# Patient Record
Sex: MALE | Race: AMERICAN INDIAN OR ALASKA NATIVE | NOT HISPANIC OR LATINO | Employment: FULL TIME | URBAN - METROPOLITAN AREA
[De-identification: names, ages, dates, MRNs, and addresses within clinical notes are randomized per-mention and may not be internally consistent; named-entity substitution may affect disease eponyms.]

---

## 2018-09-10 ENCOUNTER — HOSPITAL ENCOUNTER (EMERGENCY)
Facility: HOSPITAL | Age: 23
Discharge: HOME | End: 2018-09-10
Attending: SURGERY
Payer: COMMERCIAL

## 2018-09-10 VITALS
HEART RATE: 68 BPM | SYSTOLIC BLOOD PRESSURE: 142 MMHG | TEMPERATURE: 97 F | RESPIRATION RATE: 16 BRPM | WEIGHT: 190 LBS | DIASTOLIC BLOOD PRESSURE: 66 MMHG | BODY MASS INDEX: 25.77 KG/M2

## 2018-09-10 DIAGNOSIS — R51.9 HEADACHE: ICD-10-CM

## 2018-09-10 LAB
ALBUMIN SERPL BCP-MCNC: 4.4 G/DL
ALP SERPL-CCNC: 88 U/L
ALT SERPL W/O P-5'-P-CCNC: 32 U/L
AMPHET+METHAMPHET UR QL: NEGATIVE
ANION GAP SERPL CALC-SCNC: 9 MMOL/L
APTT BLDCRRT: 28.2 SEC
AST SERPL-CCNC: 26 U/L
BARBITURATES UR QL SCN>200 NG/ML: NEGATIVE
BASOPHILS # BLD AUTO: 0.01 K/UL
BASOPHILS NFR BLD: 0.2 %
BENZODIAZ UR QL SCN>200 NG/ML: NEGATIVE
BILIRUB SERPL-MCNC: 0.3 MG/DL
BILIRUB UR QL STRIP: NEGATIVE
BNP SERPL-MCNC: <10 PG/ML
BUN SERPL-MCNC: 11 MG/DL
BZE UR QL SCN: NEGATIVE
CALCIUM SERPL-MCNC: 10.1 MG/DL
CANNABINOIDS UR QL SCN: NEGATIVE
CHLORIDE SERPL-SCNC: 106 MMOL/L
CK MB SERPL-MCNC: 1.2 NG/ML
CK MB SERPL-RTO: 0.8 %
CK SERPL-CCNC: 154 U/L
CK SERPL-CCNC: 154 U/L
CLARITY UR: CLEAR
CO2 SERPL-SCNC: 25 MMOL/L
COLOR UR: YELLOW
CREAT SERPL-MCNC: 1.2 MG/DL
CREAT UR-MCNC: 280 MG/DL
DIFFERENTIAL METHOD: ABNORMAL
EOSINOPHIL # BLD AUTO: 0.2 K/UL
EOSINOPHIL NFR BLD: 2.8 %
ERYTHROCYTE [DISTWIDTH] IN BLOOD BY AUTOMATED COUNT: 13.8 %
ERYTHROCYTE [SEDIMENTATION RATE] IN BLOOD BY WESTERGREN METHOD: 2 MM/HR
EST. GFR  (AFRICAN AMERICAN): >60 ML/MIN/1.73 M^2
EST. GFR  (NON AFRICAN AMERICAN): >60 ML/MIN/1.73 M^2
GLUCOSE SERPL-MCNC: 79 MG/DL
GLUCOSE UR QL STRIP: NEGATIVE
HCT VFR BLD AUTO: 43.9 %
HGB BLD-MCNC: 14.4 G/DL
HGB UR QL STRIP: NEGATIVE
INR PPP: 1.1
KETONES UR QL STRIP: NEGATIVE
LEUKOCYTE ESTERASE UR QL STRIP: NEGATIVE
LYMPHOCYTES # BLD AUTO: 1.4 K/UL
LYMPHOCYTES NFR BLD: 24.9 %
MAGNESIUM SERPL-MCNC: 2.4 MG/DL
MCH RBC QN AUTO: 26.4 PG
MCHC RBC AUTO-ENTMCNC: 32.8 G/DL
MCV RBC AUTO: 81 FL
METHADONE UR QL SCN>300 NG/ML: NEGATIVE
MONOCYTES # BLD AUTO: 0.5 K/UL
MONOCYTES NFR BLD: 8.3 %
NEUTROPHILS # BLD AUTO: 3.7 K/UL
NEUTROPHILS NFR BLD: 63.8 %
NITRITE UR QL STRIP: NEGATIVE
OPIATES UR QL SCN: NEGATIVE
PCP UR QL SCN>25 NG/ML: NEGATIVE
PH UR STRIP: 6 [PH] (ref 5–8)
PHOSPHATE SERPL-MCNC: 3.6 MG/DL
PLATELET # BLD AUTO: 204 K/UL
PMV BLD AUTO: 10.9 FL
POTASSIUM SERPL-SCNC: 3.9 MMOL/L
PROT SERPL-MCNC: 8 G/DL
PROT UR QL STRIP: NEGATIVE
PROTHROMBIN TIME: 11.4 SEC
RBC # BLD AUTO: 5.45 M/UL
SODIUM SERPL-SCNC: 140 MMOL/L
SP GR UR STRIP: >=1.03 (ref 1–1.03)
TOXICOLOGY INFORMATION: NORMAL
TROPONIN I SERPL DL<=0.01 NG/ML-MCNC: <0.006 NG/ML
TSH SERPL DL<=0.005 MIU/L-ACNC: 0.95 UIU/ML
URN SPEC COLLECT METH UR: ABNORMAL
UROBILINOGEN UR STRIP-ACNC: 1 EU/DL
WBC # BLD AUTO: 5.75 K/UL

## 2018-09-10 PROCEDURE — 84443 ASSAY THYROID STIM HORMONE: CPT

## 2018-09-10 PROCEDURE — 83735 ASSAY OF MAGNESIUM: CPT

## 2018-09-10 PROCEDURE — 83880 ASSAY OF NATRIURETIC PEPTIDE: CPT

## 2018-09-10 PROCEDURE — 85651 RBC SED RATE NONAUTOMATED: CPT

## 2018-09-10 PROCEDURE — 93010 ELECTROCARDIOGRAM REPORT: CPT | Mod: ,,, | Performed by: INTERNAL MEDICINE

## 2018-09-10 PROCEDURE — 85730 THROMBOPLASTIN TIME PARTIAL: CPT

## 2018-09-10 PROCEDURE — 80307 DRUG TEST PRSMV CHEM ANLYZR: CPT

## 2018-09-10 PROCEDURE — 96372 THER/PROPH/DIAG INJ SC/IM: CPT

## 2018-09-10 PROCEDURE — 84484 ASSAY OF TROPONIN QUANT: CPT

## 2018-09-10 PROCEDURE — 82553 CREATINE MB FRACTION: CPT

## 2018-09-10 PROCEDURE — 99284 EMERGENCY DEPT VISIT MOD MDM: CPT | Mod: 25

## 2018-09-10 PROCEDURE — 85025 COMPLETE CBC W/AUTO DIFF WBC: CPT

## 2018-09-10 PROCEDURE — 84100 ASSAY OF PHOSPHORUS: CPT

## 2018-09-10 PROCEDURE — 36415 COLL VENOUS BLD VENIPUNCTURE: CPT

## 2018-09-10 PROCEDURE — 80053 COMPREHEN METABOLIC PANEL: CPT

## 2018-09-10 PROCEDURE — 85610 PROTHROMBIN TIME: CPT

## 2018-09-10 PROCEDURE — 81003 URINALYSIS AUTO W/O SCOPE: CPT | Mod: 59

## 2018-09-10 PROCEDURE — 93005 ELECTROCARDIOGRAM TRACING: CPT

## 2018-09-10 PROCEDURE — 63600175 PHARM REV CODE 636 W HCPCS: Performed by: SURGERY

## 2018-09-10 PROCEDURE — 82550 ASSAY OF CK (CPK): CPT

## 2018-09-10 RX ORDER — BUTALBITAL, ACETAMINOPHEN AND CAFFEINE 50; 325; 40 MG/1; MG/1; MG/1
1 TABLET ORAL EVERY 4 HOURS PRN
Qty: 20 TABLET | Refills: 0 | Status: SHIPPED | OUTPATIENT
Start: 2018-09-10 | End: 2018-10-10

## 2018-09-10 RX ORDER — ONDANSETRON 2 MG/ML
4 INJECTION INTRAMUSCULAR; INTRAVENOUS
Status: COMPLETED | OUTPATIENT
Start: 2018-09-10 | End: 2018-09-10

## 2018-09-10 RX ORDER — ONDANSETRON 4 MG/1
4 TABLET, ORALLY DISINTEGRATING ORAL EVERY 8 HOURS PRN
Qty: 20 TABLET | Refills: 0 | Status: SHIPPED | OUTPATIENT
Start: 2018-09-10 | End: 2018-12-18

## 2018-09-10 RX ORDER — MEPERIDINE HYDROCHLORIDE 25 MG/ML
25 INJECTION INTRAMUSCULAR; INTRAVENOUS; SUBCUTANEOUS
Status: COMPLETED | OUTPATIENT
Start: 2018-09-10 | End: 2018-09-10

## 2018-09-10 RX ADMIN — ONDANSETRON 4 MG: 2 INJECTION INTRAMUSCULAR; INTRAVENOUS at 03:09

## 2018-09-10 RX ADMIN — MEPERIDINE HYDROCHLORIDE 25 MG: 25 INJECTION INTRAMUSCULAR; INTRAVENOUS; SUBCUTANEOUS at 03:09

## 2018-09-10 NOTE — ED PROVIDER NOTES
[facility]                                                 Chief Complaint  23 y.o. male with Headache    History of Present Illness  Binh Lozano presents to the emergency room with headaches  Patient has had on again off again headaches for last couple years, worse now  Patient is alert and orient x3 with GCS 15 with a normal neurologic exam today  Patient has no nausea vomiting, no hypertension, no photophobia on evaluation  Patient states he had several concussions as a child, with residual headaches     The history is provided by the patient   device was not used during this ER visit  Medical history: Concussion, the MCL spray, scaphoid fracture wrist  Surgeries: ORIF of the radius and ulna fracture  No Known Allergies     Review of Systems and Physical Exam      Review of Systems  -- Constitution - no fever, denies fatigue, no weakness, no chills  -- Eyes - no tearing or redness, no visual disturbance  -- Ear, Nose - no tinnitus or earache, no nasal congestion or discharge  -- Mouth,Throat - no sore throat, no toothache, normal voice, normal swallowing  -- Respiratory - denies cough and congestion, no shortness of breath, no LUND  -- Cardiovascular - denies chest pain, no palpitations, denies claudication  -- Gastrointestinal - denies abdominal pain, nausea, vomiting, or diarrhea  -- Musculoskeletal - denies back pain, negative for myalgias and arthralgias   -- Neurological - headache, denies weakness or seizure; no LOC  -- Skin - denies pallor, rash, or changes in skin. no hives or welts noted    Vital Signs  His oral temperature is 96.8 °F (36 °C).  His blood pressure is 135/81 and his pulse is 67.   His respiration is 18.     Physical Exam  -- Nursing note and vitals reviewed  -- Constitutional: Appears well-developed and well-nourished  -- Head: Atraumatic. Normocephalic. No obvious abnormality  -- Eyes: Pupils are equal and reactive to light. Normal  conjunctiva and lids  -- Nose: Nose normal in appearance, nares grossly normal. No discharge  -- Throat: Mucous membranes moist, pharynx normal, normal tonsils. No lesions   -- Ears: External ears and TM normal bilaterally. Normal hearing and no drainage  -- Neck: Normal range of motion. Neck supple. No masses, trachea midline  -- Cardiac: Normal rate, regular rhythm and normal heart sounds  -- Pulmonary: Normal respiratory effort, breath sounds clear to auscultation  -- Abdominal: Soft, no tenderness. Normal bowel sounds. Normal liver edge  -- Musculoskeletal: Normal range of motion, no effusions. Joints stable   -- Neurological: No focal deficits. Showed good interaction with staff  -- Skin: Warm and dry. No evidence of rash or cellulitis    Emergency Room Course      Lab Results     K 3.9      CO2 25   BUN 11   CREATININE 1.2   GLU 79   ALKPHOS 88   AST 26   ALT 32   BILITOT 0.3   ALBUMIN 4.4   PROT 8.0   WBC 5.75   HGB 14.4   HCT 43.9            CPKMB 1.2   TROPONINI <0.006   INR 1.1   BNP <10   MG 2.4   TSH 0.955     Urinalysis  -- Urinalysis performed during this ER visit showed no signs of infection      EKG  -- The EKG findings today were without concerning findings from baseline    Radiology  -- The CT of the head performed in the ER today was negative for acute pathology     Medications Given  meperidine (PF) injection 25 mg (25 mg Intramuscular Given 9/10/18 1553)   ondansetron injection 4 mg (4 mg Intramuscular Given 9/10/18 1553)     Diagnosis  -- The encounter diagnosis was Headache.    Disposition and Plan  -- Disposition: home  -- Condition: stable  -- Follow-up: Patient to follow up with a MD in 1-2 days.  -- I advised the patient that we have found no life threatening condition today  -- At this time, I believe the patient is clinically stable for discharge.   -- The patient acknowledges that close follow up with a MD is required   -- Patient agrees to comply with  all instruction and direction given in the ER    This note is dictated on Dragon Natural Speaking word recognition program.  There are word recognition mistakes that are occasionally missed on review.          James Pollock MD  09/10/18 0674

## 2018-09-10 NOTE — ED NOTES
The patient is awake, alert and cooperative with a calm affect, patient is aware of environment. Airway is open and patent, respirations are spontaneous, normal respiratory effort and rate noted, skin warm and dry, full ROM in all extremities, appearance: no apparent distress noted, resting comfortably.  VSS, no change from previous assessment.  Bed in low, locked position, SR x1, HOB 30 degrees.  Pt able to change position independently. Will continue to monitor.

## 2018-09-10 NOTE — ED NOTES
Discharged to home/self care.    - Condition at discharge: Good  - Mode of Discharge: Ambulatory  - The patient left the ED accompanied by a family member.  - The discharge instructions were discussed with the patient & parents  - They both stated an understanding of the discharge instructions.  - Walked pt to the discharge station.

## 2018-09-10 NOTE — ED TRIAGE NOTES
Arrives per self transport from home. Presents with complaints of migraine headache. Reports history of concussion in the past

## 2018-09-10 NOTE — ED NOTES
Pt was instructed not to drive home after receiving narcotic medication. Pt verbalized understanding by stating his parents would drive him home.

## 2018-09-18 ENCOUNTER — OFFICE VISIT (OUTPATIENT)
Dept: NEUROLOGY | Facility: CLINIC | Age: 23
End: 2018-09-18
Payer: COMMERCIAL

## 2018-09-18 VITALS
BODY MASS INDEX: 24.07 KG/M2 | HEART RATE: 72 BPM | HEIGHT: 72 IN | WEIGHT: 177.69 LBS | DIASTOLIC BLOOD PRESSURE: 74 MMHG | SYSTOLIC BLOOD PRESSURE: 112 MMHG | RESPIRATION RATE: 18 BRPM

## 2018-09-18 DIAGNOSIS — G43.119 INTRACTABLE MIGRAINE WITH AURA WITHOUT STATUS MIGRAINOSUS: Primary | ICD-10-CM

## 2018-09-18 DIAGNOSIS — R55 VASOVAGAL SYNCOPE: ICD-10-CM

## 2018-09-18 PROCEDURE — 99204 OFFICE O/P NEW MOD 45 MIN: CPT | Mod: S$GLB,,, | Performed by: PSYCHIATRY & NEUROLOGY

## 2018-09-18 PROCEDURE — 99999 PR PBB SHADOW E&M-EST. PATIENT-LVL III: CPT | Mod: PBBFAC,,, | Performed by: PSYCHIATRY & NEUROLOGY

## 2018-09-18 PROCEDURE — 3008F BODY MASS INDEX DOCD: CPT | Mod: CPTII,S$GLB,, | Performed by: PSYCHIATRY & NEUROLOGY

## 2018-09-18 RX ORDER — ZONISAMIDE 25 MG/1
CAPSULE ORAL
Qty: 60 CAPSULE | Refills: 5 | Status: SHIPPED | OUTPATIENT
Start: 2018-09-18 | End: 2018-12-18 | Stop reason: SDUPTHER

## 2018-09-18 NOTE — PROGRESS NOTES
HPI: Binh Lozano is a 23 y.o. male with headache which started throughout the years but was rare.   About 2 months ago, he started having more headaches.     Sometimes the headaches start with warning of squiggling lines and brightness and then headaches occur in the frontal region and more on the left than right. Pain escalates to 10/10 pain and he is sensitive to light during the spells in which he has nausea and this last for hours. Medications don't seem to help but sleep does. He has tried OTC meds prior.  This is occurring about 5-9 times per month in the past 2 months. Prior to that he was having rare spells in which he did have some aura.   He reported to the ER On 9/10/2018 after having a prolonged migraine for about 4 hours and then feeling a spinning and nausea sensation and then suffering syncope after standing and feeling more light headed. He recalls everything looking black around him and realizing he had fainting. This is not mentioned in her ER note, but his mom who is here today states she was called to come pick him up due to this spell. He was awake and not confused after this happened. No seizure activity.  Patient never had fainting before or after.    Migraines run in his family  Mom notes patient had 3 concussions in his youth (last being 5 years ago) with playing football.  No history of renal stones.     He works as a supervisor.         Review of Systems   Constitutional: Negative for fever.   HENT: Negative for nosebleeds.    Eyes: Negative for double vision.   Respiratory: Negative for hemoptysis.    Cardiovascular: Negative for leg swelling.   Gastrointestinal: Negative for blood in stool.   Genitourinary: Negative for hematuria.   Musculoskeletal: Negative for falls.   Skin: Negative for rash.   Neurological: Positive for headaches. Negative for seizures.   Psychiatric/Behavioral: Negative for memory loss. The patient does not have insomnia.          I have reviewed  all of this patient's past medical and surgical histories as well as family and social histories and active allergies and medications as documented in the electronic medical record.            Exam:  Gen Appearance, well developed/nourished in no apparent distress  CV: 2+ distal pulses with no edema or swelling  Neuro:  MS: Awake, alert, oriented to place, person, time, situation. Sustains attention. Recent/remote memory intact, Language is full to spontaneous speech/repetition/naming/comprehension. Fund of Knowledge is full  CN: Optic discs are flat with normal vasculature, PERRL, Extraoccular movements and visual fields are full. Normal facial sensation and strength, Hearing symmetric, Tongue and Palate are midline and strong. Shoulder Shrug symmetric and strong.  Motor: Normal bulk, tone, no abnormal movements. 5/5 strength bilateral upper/lower extremities with 2+ reflexes and bilateral plantar response  Sensory: symmetric to light touch, pain, temp, and vibration/proprioception. Romberg negative  Cerebellar: Finger-nose,Heal-shin, Rapid alternating movements intact  Gait: Normal stance, no ataxia    Imagin2018 CT head: No acute abnormality    Labs: 9/10/2018 ER labs reviewed.   Troponin normal    EKG normal    Assessment/Plan: Binh Lozano is a 23 y.o. male with episodic migraine with aura, more frequent recently.     I recommend:   1. Would benefit from a migraine prevention medication: Start Zonegran per orders Unless sedation, mood changes or other side effects. Consider tapering med in 3-6 months if he has return to his baseline rare headache frequency again soon.   2. He did suffer a brief vasal vagal syncopal spell when standing after prolonged headache this month. Monitor for any recurrent symptoms. Notify me if so. CT head, EKG and troponin in the ER negative  3. He can use OTC meds PRN headache. Avoid overuse of fioricet given in ED reviewed. He is not overusing analgesic. Can  continue zofran PRN  RTC 12 weeks.

## 2018-12-18 ENCOUNTER — OFFICE VISIT (OUTPATIENT)
Dept: NEUROLOGY | Facility: CLINIC | Age: 23
End: 2018-12-18
Payer: COMMERCIAL

## 2018-12-18 VITALS
BODY MASS INDEX: 23.29 KG/M2 | HEIGHT: 72 IN | RESPIRATION RATE: 14 BRPM | WEIGHT: 171.94 LBS | DIASTOLIC BLOOD PRESSURE: 82 MMHG | SYSTOLIC BLOOD PRESSURE: 122 MMHG | HEART RATE: 78 BPM

## 2018-12-18 DIAGNOSIS — Z87.820 HISTORY OF MULTIPLE CONCUSSIONS: ICD-10-CM

## 2018-12-18 DIAGNOSIS — G43.109 MIGRAINE WITH AURA AND WITHOUT STATUS MIGRAINOSUS, NOT INTRACTABLE: Primary | ICD-10-CM

## 2018-12-18 DIAGNOSIS — R55 SYNCOPE, UNSPECIFIED SYNCOPE TYPE: ICD-10-CM

## 2018-12-18 PROCEDURE — 99214 OFFICE O/P EST MOD 30 MIN: CPT | Mod: S$GLB,,, | Performed by: NURSE PRACTITIONER

## 2018-12-18 PROCEDURE — 99999 PR PBB SHADOW E&M-EST. PATIENT-LVL III: CPT | Mod: PBBFAC,,, | Performed by: NURSE PRACTITIONER

## 2018-12-18 PROCEDURE — 3008F BODY MASS INDEX DOCD: CPT | Mod: CPTII,S$GLB,, | Performed by: NURSE PRACTITIONER

## 2018-12-18 RX ORDER — ZONISAMIDE 25 MG/1
50 CAPSULE ORAL NIGHTLY
Qty: 60 CAPSULE | Refills: 5 | Status: SHIPPED | OUTPATIENT
Start: 2018-12-18 | End: 2019-03-21 | Stop reason: SDUPTHER

## 2018-12-18 NOTE — PROGRESS NOTES
HPI: Binh Lozano is a 23 y.o. male with episodic migraine with aura, more frequent recently. Syncopal episode after a prolonged migraine in 9/2018, with unremarkable workup per ED. He had 3 concussions in his youth (last being 5 years ago) with playing football. He works as a supervisor in a machine shop.     He presents today for a follow up visit. Zonegran was started for headache prevention at his last visit, which has been very effective. His headaches are reduced from 9x per month to none, unless he misses a dose of Zonegran. One headaches since his started Zonegran, and only after missing a dose. This was two weeks ago. He is tolerating Zonegran well, with no side effects.     Review of Systems   Constitutional: Negative for fever.   HENT: Negative for nosebleeds.    Eyes: Negative for double vision.   Respiratory: Negative for hemoptysis.    Cardiovascular: Negative for leg swelling.   Gastrointestinal: Negative for blood in stool.   Genitourinary: Negative for hematuria.   Musculoskeletal: Negative for falls.   Skin: Negative for rash.   Neurological: Positive for headaches. Negative for seizures.   Psychiatric/Behavioral: Negative for memory loss. The patient does not have insomnia.        I have reviewed all of this patient's past medical and surgical histories as well as family and social histories and active allergies and medications as documented in the electronic medical record.    Exam:  Gen Appearance, well developed/nourished in no apparent distress  CV: 2+ distal pulses with no edema or swelling  Neuro:  MS: Awake, alert, oriented to place, person, time, situation. Sustains attention. Recent/remote memory intact, Language is full to spontaneous speech/repetition/naming/comprehension. Fund of Knowledge is full  CN: Optic discs are flat with normal vasculature, PERRL, Extraoccular movements and visual fields are full. Normal facial sensation and strength, Hearing symmetric, Tongue and  Palate are midline and strong. Shoulder Shrug symmetric and strong.  Motor: Normal bulk, tone, no abnormal movements. 5/5 strength bilateral upper/lower extremities with 2+ reflexes and bilateral plantar response  Sensory: symmetric to light touch, pain, temp, and vibration/proprioception. Romberg negative  Cerebellar: Finger-nose,Heal-shin, Rapid alternating movements intact  Gait: Normal stance, no ataxia    Imagin2018 CT head: No acute abnormality    Labs: 9/10/2018 ER labs reviewed.   Troponin normal    EKG normal    Assessment/Plan: Binh Lozano is a 23 y.o. male with episodic migraine with aura, more frequent recently. Syncopal episode after a prolonged migraine in 2018, with unremarkable workup per ED. He had 3 concussions in his youth (last being 5 years ago) with playing football. He works as a supervisor in a machine shop.      I recommend:   1. Continue Zonegran 50 mg qhs for migraine prevention. Migraine returned after missing a dose two weeks ago; therefore, taper will not be attempted at this time. Re-evalaute in 3 months to determine if he returns to baseline episodic migraine.   2. He did suffer a brief vasal vagal syncopal spell when standing after prolonged headache this month. Monitor for any recurrent symptoms. Notify me if so. CT head, EKG and troponin in the ER negative  3. He can use OTC meds PRN headache. Avoid overuse of fioricet given in ED reviewed. He is not overusing analgesic. Can continue zofran PRN    RTC 12 weeks.

## 2019-03-21 ENCOUNTER — OFFICE VISIT (OUTPATIENT)
Dept: NEUROLOGY | Facility: CLINIC | Age: 24
End: 2019-03-21
Payer: COMMERCIAL

## 2019-03-21 VITALS
WEIGHT: 175.94 LBS | HEART RATE: 78 BPM | BODY MASS INDEX: 23.83 KG/M2 | SYSTOLIC BLOOD PRESSURE: 124 MMHG | HEIGHT: 72 IN | DIASTOLIC BLOOD PRESSURE: 76 MMHG | RESPIRATION RATE: 16 BRPM

## 2019-03-21 DIAGNOSIS — G43.109 MIGRAINE WITH AURA AND WITHOUT STATUS MIGRAINOSUS, NOT INTRACTABLE: Primary | ICD-10-CM

## 2019-03-21 DIAGNOSIS — Z87.820 HISTORY OF MULTIPLE CONCUSSIONS: ICD-10-CM

## 2019-03-21 PROCEDURE — 99214 PR OFFICE/OUTPT VISIT, EST, LEVL IV, 30-39 MIN: ICD-10-PCS | Mod: S$GLB,,, | Performed by: NURSE PRACTITIONER

## 2019-03-21 PROCEDURE — 99999 PR PBB SHADOW E&M-EST. PATIENT-LVL III: CPT | Mod: PBBFAC,,, | Performed by: NURSE PRACTITIONER

## 2019-03-21 PROCEDURE — 99214 OFFICE O/P EST MOD 30 MIN: CPT | Mod: S$GLB,,, | Performed by: NURSE PRACTITIONER

## 2019-03-21 PROCEDURE — 3008F PR BODY MASS INDEX (BMI) DOCUMENTED: ICD-10-PCS | Mod: CPTII,S$GLB,, | Performed by: NURSE PRACTITIONER

## 2019-03-21 PROCEDURE — 99999 PR PBB SHADOW E&M-EST. PATIENT-LVL III: ICD-10-PCS | Mod: PBBFAC,,, | Performed by: NURSE PRACTITIONER

## 2019-03-21 PROCEDURE — 3008F BODY MASS INDEX DOCD: CPT | Mod: CPTII,S$GLB,, | Performed by: NURSE PRACTITIONER

## 2019-03-21 RX ORDER — KETOROLAC TROMETHAMINE 10 MG/1
10 TABLET, FILM COATED ORAL DAILY PRN
Qty: 9 TABLET | Refills: 3 | Status: SHIPPED | OUTPATIENT
Start: 2019-03-21

## 2019-03-21 RX ORDER — ZONISAMIDE 25 MG/1
75 CAPSULE ORAL NIGHTLY
Qty: 90 CAPSULE | Refills: 3 | Status: SHIPPED | OUTPATIENT
Start: 2019-03-21 | End: 2019-06-26 | Stop reason: SDUPTHER

## 2019-03-21 NOTE — PROGRESS NOTES
HPI: Binh Lozano is a 23 y.o. male with episodic migraine with aura, more frequent recently. Syncopal episode after a prolonged migraine in 9/2018, with unremarkable workup per ED. He had 3 concussions in his youth (last being 5 years ago) with playing football. He works as a supervisor in a machine shop.     He presents today for a follow up visit. He continues to experience 2-4 headaches per month. He continues on Zonegran. His remaining migraines are severe. Associated with nausea and dizziness. He has an aura of bright vision prior the onset of his headaches. No numbness or weakness; no autonomic complaints. Headaches can last for hours; however, he takes an extra Zonegran at the onset of a headache, which aborts the headache, but results in fatigue.     No further syncopal episodes with migraine. This occurred only once in 9/2018, with unremarkable workup.     Review of Systems   Constitutional: Negative for fever.   HENT: Negative for nosebleeds.    Eyes: Negative for double vision.   Respiratory: Negative for hemoptysis.    Cardiovascular: Negative for leg swelling.   Gastrointestinal: Negative for blood in stool.   Genitourinary: Negative for hematuria.   Musculoskeletal: Negative for falls.   Skin: Negative for rash.   Neurological: Positive for headaches. Negative for seizures.   Psychiatric/Behavioral: Negative for memory loss. The patient does not have insomnia.        I have reviewed all of this patient's past medical and surgical histories as well as family and social histories and active allergies and medications as documented in the electronic medical record.    Exam:  Gen Appearance, well developed/nourished in no apparent distress  CV: 2+ distal pulses with no edema or swelling  Neuro:  MS: Awake, alert, oriented to place, person, time, situation. Sustains attention. Recent/remote memory intact, Language is full to spontaneous speech/repetition/naming/comprehension. Fund of Knowledge is  full  CN: Optic discs are flat with normal vasculature, PERRL, Extraoccular movements and visual fields are full. Normal facial sensation and strength, Hearing symmetric, Tongue and Palate are midline and strong. Shoulder Shrug symmetric and strong.  Motor: Normal bulk, tone, no abnormal movements. 5/5 strength bilateral upper/lower extremities with 2+ reflexes and bilateral plantar response  Sensory: symmetric to light touch, pain, temp, and vibration/proprioception. Romberg negative  Cerebellar: Finger-nose,Heal-shin, Rapid alternating movements intact  Gait: Normal stance, no ataxia    Imagin/2018 CT head: No acute abnormality    Labs:   9/10/2018 ER labs reviewed.   Troponin normal    EKG normal    Assessment/Plan: Binh Lozano is a 23 y.o. male with episodic migraine with aura, more frequent recently. Syncopal episode after a prolonged migraine in 2018, with unremarkable workup per ED. He had 3 concussions in his youth (last being 5 years ago) with playing football. He works as a supervisor in a machine shop.      I recommend:   1. Increase Zonegran to 75 mg qhs for migraine prevention, as Zonegran has been moderately effective, but remaining migraines are severe.   2. Add Toradol 10 mg prn to abort headaches. GI/CV risks reviewed. Limit use to 2 days per week. He has been using an extra dose of Zonegran 25 mg to abort his headaches, which is effective, but is sedating.   3. He did suffer a brief vasal vagal syncopal spell when standing after prolonged headache this month. Monitor for any recurrent symptoms. Notify me if so. CT head, EKG and troponin in the ER negative    RTC 12 weeks

## 2019-06-26 ENCOUNTER — OFFICE VISIT (OUTPATIENT)
Dept: NEUROLOGY | Facility: CLINIC | Age: 24
End: 2019-06-26
Payer: COMMERCIAL

## 2019-06-26 VITALS
WEIGHT: 178.13 LBS | RESPIRATION RATE: 18 BRPM | HEIGHT: 72 IN | HEART RATE: 68 BPM | SYSTOLIC BLOOD PRESSURE: 106 MMHG | BODY MASS INDEX: 24.13 KG/M2 | DIASTOLIC BLOOD PRESSURE: 80 MMHG

## 2019-06-26 DIAGNOSIS — R55 SYNCOPE, UNSPECIFIED SYNCOPE TYPE: Primary | ICD-10-CM

## 2019-06-26 DIAGNOSIS — Z87.820 HISTORY OF MULTIPLE CONCUSSIONS: ICD-10-CM

## 2019-06-26 DIAGNOSIS — G43.109 MIGRAINE WITH AURA AND WITHOUT STATUS MIGRAINOSUS, NOT INTRACTABLE: ICD-10-CM

## 2019-06-26 PROCEDURE — 99214 PR OFFICE/OUTPT VISIT, EST, LEVL IV, 30-39 MIN: ICD-10-PCS | Mod: S$GLB,,, | Performed by: NURSE PRACTITIONER

## 2019-06-26 PROCEDURE — 3008F PR BODY MASS INDEX (BMI) DOCUMENTED: ICD-10-PCS | Mod: CPTII,S$GLB,, | Performed by: NURSE PRACTITIONER

## 2019-06-26 PROCEDURE — 3008F BODY MASS INDEX DOCD: CPT | Mod: CPTII,S$GLB,, | Performed by: NURSE PRACTITIONER

## 2019-06-26 PROCEDURE — 99214 OFFICE O/P EST MOD 30 MIN: CPT | Mod: S$GLB,,, | Performed by: NURSE PRACTITIONER

## 2019-06-26 PROCEDURE — 99999 PR PBB SHADOW E&M-EST. PATIENT-LVL III: ICD-10-PCS | Mod: PBBFAC,,, | Performed by: NURSE PRACTITIONER

## 2019-06-26 PROCEDURE — 99999 PR PBB SHADOW E&M-EST. PATIENT-LVL III: CPT | Mod: PBBFAC,,, | Performed by: NURSE PRACTITIONER

## 2019-06-26 RX ORDER — ZONISAMIDE 25 MG/1
75 CAPSULE ORAL NIGHTLY
Qty: 90 CAPSULE | Refills: 5 | Status: SHIPPED | OUTPATIENT
Start: 2019-06-26 | End: 2019-11-06 | Stop reason: DRUGHIGH

## 2019-06-26 NOTE — PROGRESS NOTES
HPI: Binh Lozano is a 23 y.o. male with episodic migraine with aura, more frequent recently. Syncopal episode after a prolonged migraine in 9/2018, with unremarkable workup per ED. He had 3 concussions in his youth (last being 5 years ago) with playing football. He works as a supervisor in a machine shop.     He presents today for a follow up visit. His Zonegran was increased to 75 mg at his last visit, and he has had complete resolution of his headaches.     No further syncopal episodes with migraine. This occurred only once in 9/2018, with unremarkable workup.     Review of Systems   Constitutional: Negative for fever.   HENT: Negative for nosebleeds.    Eyes: Negative for double vision.   Respiratory: Negative for hemoptysis.    Cardiovascular: Negative for leg swelling.   Gastrointestinal: Negative for blood in stool.   Genitourinary: Negative for hematuria.   Musculoskeletal: Negative for falls.   Skin: Negative for rash.   Neurological: Positive for headaches. Negative for seizures.   Psychiatric/Behavioral: Negative for memory loss. The patient does not have insomnia.        I have reviewed all of this patient's past medical and surgical histories as well as family and social histories and active allergies and medications as documented in the electronic medical record.    Exam:  Gen Appearance, well developed/nourished in no apparent distress  CV: 2+ distal pulses with no edema or swelling  Neuro:  MS: Awake, alert, oriented to place, person, time, situation. Sustains attention. Recent/remote memory intact, Language is full to spontaneous speech/repetition/naming/comprehension. Fund of Knowledge is full  CN: Optic discs are flat with normal vasculature, PERRL, Extraoccular movements and visual fields are full. Normal facial sensation and strength, Hearing symmetric, Tongue and Palate are midline and strong. Shoulder Shrug symmetric and strong.  Motor: Normal bulk, tone, no abnormal movements.  5/5 strength bilateral upper/lower extremities with 2+ reflexes and bilateral plantar response  Sensory: symmetric to light touch, pain, temp, and vibration/proprioception. Romberg negative  Cerebellar: Finger-nose,Heal-shin, Rapid alternating movements intact  Gait: Normal stance, no ataxia    Imagin/2018 CT head: No acute abnormality    Labs:   9/10/2018 ER labs reviewed.   Troponin normal    EKG normal    Assessment/Plan: Binh Lozano is a 23 y.o. male with episodic migraine with aura, more frequent recently. Syncopal episode after a prolonged migraine in 2018, with unremarkable workup per ED. He had 3 concussions in his youth (last being 5 years ago) with playing football. He works as a supervisor in a machine shop.      I recommend:   1. Continue Zonegran 75 mg qhs for migraine prevention, which is fully effective.   2. Continue Toradol 10 mg prn to abort headaches, but he has not yet needed to take this, as headaches are resolved with Zonegran increase. GI/CV risks reviewed. Limit use to 2 days per week. He has been using an extra dose of Zonegran 25 mg to abort his headaches, which is effective, but is sedating.   3. He did suffer a brief vasal vagal syncopal spell when standing after prolonged headache this month. Monitor for any recurrent symptoms. Notify me if so. CT head, EKG and troponin in the ER negative    RTC 6 months

## 2019-10-25 ENCOUNTER — TELEPHONE (OUTPATIENT)
Dept: NEUROLOGY | Facility: CLINIC | Age: 24
End: 2019-10-25

## 2019-10-25 NOTE — TELEPHONE ENCOUNTER
"Patient states that the migraines are getting worse. He is still taking Zonegran 75 mg daily and using Toradol prn. He is having to miss work at times due to headaches and his employer has been "getting on his case" about it. He is scheduled to see you in December for follow up. He is asking if a medication change/dose adjustment can be made and if you can write a letter to his employer explaining the situation with his headaches. He may need to file for FMLA. He works for [employer] in the [employer]. Please advise.  "

## 2019-10-25 NOTE — TELEPHONE ENCOUNTER
----- Message from Elisabeth Diaz MA sent at 10/25/2019  9:14 AM CDT -----  Contact: Patient  Binh Lozano  MRN: [id number]  : 1995  PCP: Primary Doctor No  Home Phone      [phone]  Work Phone      Not on file.  Mobile          [phone]      MESSAGE:  Patient states he was told to call clinic if his migraines got worse. He state they are getting progressively worse and asking for a return call to [phone].

## 2019-10-29 NOTE — TELEPHONE ENCOUNTER
Please schedule patient to see me sooner for further evaluation and discussion. If FMLA paperwork is signed, they will want to see that a recent visit has occurred to address worsening complaints that are affecting his position.

## 2019-11-06 ENCOUNTER — OFFICE VISIT (OUTPATIENT)
Dept: NEUROLOGY | Facility: CLINIC | Age: 24
End: 2019-11-06
Payer: COMMERCIAL

## 2019-11-06 VITALS
BODY MASS INDEX: 24.93 KG/M2 | SYSTOLIC BLOOD PRESSURE: 110 MMHG | HEIGHT: 72 IN | DIASTOLIC BLOOD PRESSURE: 74 MMHG | WEIGHT: 184.06 LBS | HEART RATE: 68 BPM | RESPIRATION RATE: 18 BRPM

## 2019-11-06 DIAGNOSIS — G43.109 MIGRAINE WITH AURA AND WITHOUT STATUS MIGRAINOSUS, NOT INTRACTABLE: Primary | ICD-10-CM

## 2019-11-06 PROCEDURE — 99999 PR PBB SHADOW E&M-EST. PATIENT-LVL III: CPT | Mod: PBBFAC,,, | Performed by: NURSE PRACTITIONER

## 2019-11-06 PROCEDURE — 99999 PR PBB SHADOW E&M-EST. PATIENT-LVL III: ICD-10-PCS | Mod: PBBFAC,,, | Performed by: NURSE PRACTITIONER

## 2019-11-06 PROCEDURE — 99214 OFFICE O/P EST MOD 30 MIN: CPT | Mod: S$GLB,,, | Performed by: NURSE PRACTITIONER

## 2019-11-06 PROCEDURE — 3008F PR BODY MASS INDEX (BMI) DOCUMENTED: ICD-10-PCS | Mod: CPTII,S$GLB,, | Performed by: NURSE PRACTITIONER

## 2019-11-06 PROCEDURE — 99214 PR OFFICE/OUTPT VISIT, EST, LEVL IV, 30-39 MIN: ICD-10-PCS | Mod: S$GLB,,, | Performed by: NURSE PRACTITIONER

## 2019-11-06 PROCEDURE — 3008F BODY MASS INDEX DOCD: CPT | Mod: CPTII,S$GLB,, | Performed by: NURSE PRACTITIONER

## 2019-11-06 RX ORDER — ZONISAMIDE 100 MG/1
100 CAPSULE ORAL NIGHTLY
Qty: 30 CAPSULE | Refills: 2 | Status: SHIPPED | OUTPATIENT
Start: 2019-11-06 | End: 2020-01-23 | Stop reason: ALTCHOICE

## 2019-11-06 NOTE — PROGRESS NOTES
HPI: Binh Lozano is a 24 y.o. male with episodic migraine with aura, more frequent recently. Syncopal episode after a prolonged migraine in 9/2018, with unremarkable workup per ED. He had 3 concussions in his youth (last being 5 years ago) with playing football. He works as a supervisor in a machine shop.     He presents today with complaint of more frequent headaches, which began one month ago. Headaches were resolved after a Zonegran increase in 3/2019, and are now occurring 2x per month. Headaches are located frontally/retro-orbitally and last for a couple hours at a time. Sleeping does help. He has not tried Toradol to abort his headaches and does not take medication for this. Headaches begin in the morning upon waking. No neck pain. He does sleep well at night. No sleep disturbances.     Headaches are preceded by everything appearing overall brighter/visual aura; + photophobia with his headaches, but no other associated complaints. No stress at this time.     No further syncopal episodes with migraine. This occurred only once in 9/2018, with unremarkable workup.     Review of Systems   Constitutional: Negative for fever.   HENT: Negative for nosebleeds.    Eyes: Negative for double vision.   Respiratory: Negative for hemoptysis.    Cardiovascular: Negative for leg swelling.   Gastrointestinal: Negative for blood in stool.   Genitourinary: Negative for hematuria.   Musculoskeletal: Negative for falls.   Skin: Negative for rash.   Neurological: Positive for headaches. Negative for seizures.   Psychiatric/Behavioral: Negative for memory loss. The patient does not have insomnia.        I have reviewed all of this patient's past medical and surgical histories as well as family and social histories and active allergies and medications as documented in the electronic medical record.    Exam:  Gen Appearance, well developed/nourished in no apparent distress  CV: 2+ distal pulses with no edema or  swelling  Neuro:  MS: Awake, alert, oriented to place, person, time, situation. Sustains attention. Recent/remote memory intact, Language is full to spontaneous speech/repetition/naming/comprehension. Fund of Knowledge is full  CN: Optic discs are flat with normal vasculature, PERRL, Extraoccular movements and visual fields are full. Normal facial sensation and strength, Hearing symmetric, Tongue and Palate are midline and strong. Shoulder Shrug symmetric and strong.  Motor: Normal bulk, tone, no abnormal movements. 5/5 strength bilateral upper/lower extremities with 2+ reflexes and bilateral plantar response  Sensory: symmetric to light touch, pain, temp, and vibration/proprioception. Romberg negative  Cerebellar: Finger-nose,Heal-shin, Rapid alternating movements intact  Gait: Normal stance, no ataxia    Imagin/2018 CT head: No acute abnormality    Labs:   9/10/2018 ER labs reviewed  Troponin normal    EKG normal    Assessment/Plan: Binh Lozano is a 24 y.o. male with episodic migraine with aura, more frequent recently. Syncopal episode after a prolonged migraine in 2018, with unremarkable workup per ED. He had 3 concussions in his youth (last being 5 years ago) with playing football. He works as a supervisor in a machine shop.      I recommend:   1. Increase Zonegran to 100 mg qhs for migraine prevention for more frequent headaches. He has responded well to increases prior.  -exam unremarkable; no imaging needed, unless headaches change in nature or if he should fail to respond to treatment.    2. Keep headache log until next visit.   3. Continue Toradol 10 mg prn to abort headaches, but he has tried this, as headaches are resolved with Zonegran increase. GI/CV risks reviewed. Limit use to 2 days per week.   4. He did suffer a brief vasal vagal syncopal spell when standing after prolonged headache this month. Monitor for any recurrent symptoms. Notify me if so. CT head, EKG and troponin in  the ER negative    RTC 6-8 weeks

## 2020-01-23 ENCOUNTER — OFFICE VISIT (OUTPATIENT)
Dept: NEUROLOGY | Facility: CLINIC | Age: 25
End: 2020-01-23
Payer: COMMERCIAL

## 2020-01-23 VITALS
SYSTOLIC BLOOD PRESSURE: 142 MMHG | HEIGHT: 72 IN | DIASTOLIC BLOOD PRESSURE: 96 MMHG | HEART RATE: 72 BPM | WEIGHT: 187.81 LBS | BODY MASS INDEX: 25.44 KG/M2 | RESPIRATION RATE: 16 BRPM

## 2020-01-23 DIAGNOSIS — G43.109 MIGRAINE WITH AURA AND WITHOUT STATUS MIGRAINOSUS, NOT INTRACTABLE: Primary | ICD-10-CM

## 2020-01-23 DIAGNOSIS — R41.3 MEMORY LOSS: ICD-10-CM

## 2020-01-23 PROCEDURE — 99999 PR PBB SHADOW E&M-EST. PATIENT-LVL IV: ICD-10-PCS | Mod: PBBFAC,,, | Performed by: NURSE PRACTITIONER

## 2020-01-23 PROCEDURE — 99214 OFFICE O/P EST MOD 30 MIN: CPT | Mod: S$GLB,,, | Performed by: NURSE PRACTITIONER

## 2020-01-23 PROCEDURE — 99214 PR OFFICE/OUTPT VISIT, EST, LEVL IV, 30-39 MIN: ICD-10-PCS | Mod: S$GLB,,, | Performed by: NURSE PRACTITIONER

## 2020-01-23 PROCEDURE — 99999 PR PBB SHADOW E&M-EST. PATIENT-LVL IV: CPT | Mod: PBBFAC,,, | Performed by: NURSE PRACTITIONER

## 2020-01-23 PROCEDURE — 3008F BODY MASS INDEX DOCD: CPT | Mod: CPTII,S$GLB,, | Performed by: NURSE PRACTITIONER

## 2020-01-23 PROCEDURE — 3008F PR BODY MASS INDEX (BMI) DOCUMENTED: ICD-10-PCS | Mod: CPTII,S$GLB,, | Performed by: NURSE PRACTITIONER

## 2020-01-23 RX ORDER — ZONISAMIDE 50 MG/1
50 CAPSULE ORAL NIGHTLY
Qty: 30 CAPSULE | Refills: 2 | Status: SHIPPED | OUTPATIENT
Start: 2020-01-23 | End: 2021-01-22

## 2020-01-23 NOTE — PROGRESS NOTES
HPI: Binh Lozano is a 24 y.o. male with episodic migraine with aura, more frequent recently. Syncopal episode after a prolonged migraine in 9/2018, with unremarkable workup per ED. He had 3 concussions in his youth (last being 5 years ago) with playing football. He works as a supervisor in a machine shop.     He presents today for a follow up visit. Zonegran increased to 100 mg at his last visit for increased headaches, which were occurring 2x per month, and causing him to have to leave work. He is now having headaches once per week, and reports to having short term memory loss, slugglishness, and appetite reduction since the Zonegran increase.     Headaches were more frequent in 10/2019. Before this-headaches were resolved after a Zonegran increase in 3/2019. Headaches are located frontally/retro-orbitally and last for a couple hours at a time. Sleeping does help. Toradol is effective in aborting his headaches. Headaches begin in the morning upon waking. No neck pain. He does sleep well at night. No sleep disturbances.     Some increased stress around the holidays. BP is increased in the 150's initially during his visit.     Headaches are preceded by everything appearing overall brighter/visual aura; + photophobia with his headaches, but no other associated complaints.     No further syncopal episodes with migraine. This occurred only once in 9/2018, with unremarkable workup.     Review of Systems   Constitutional: Positive for malaise/fatigue. Negative for fever.   HENT: Negative for nosebleeds.    Eyes: Negative for double vision.   Respiratory: Negative for hemoptysis.    Cardiovascular: Negative for leg swelling.   Gastrointestinal: Negative for blood in stool.   Genitourinary: Negative for hematuria.   Musculoskeletal: Negative for falls.   Skin: Negative for rash.   Neurological: Positive for headaches. Negative for seizures.   Psychiatric/Behavioral: Positive for memory loss. The patient does  not have insomnia.        I have reviewed all of this patient's past medical and surgical histories as well as family and social histories and active allergies and medications as documented in the electronic medical record.    Exam:  Gen Appearance, well developed/nourished in no apparent distress  CV: 2+ distal pulses with no edema or swelling  Neuro:  MS: Awake, alert, oriented to place, person, time, situation. Sustains attention. Recent/remote memory intact, Language is full to spontaneous speech/repetition/naming/comprehension. Fund of Knowledge is full  CN: Optic discs are flat with normal vasculature, PERRL, Extraoccular movements and visual fields are full. Normal facial sensation and strength, Hearing symmetric, Tongue and Palate are midline and strong. Shoulder Shrug symmetric and strong.  Motor: Normal bulk, tone, no abnormal movements. 5/5 strength bilateral upper/lower extremities with 2+ reflexes and bilateral plantar response  Sensory: symmetric to light touch, pain, temp, and vibration/proprioception. Romberg negative  Cerebellar: Finger-nose,Heal-shin, Rapid alternating movements intact  Gait: Normal stance, no ataxia    Imagin/2018 CT head: No acute abnormality    Labs:   9/10/2018 ER labs reviewed  Troponin normal    EKG normal    Assessment/Plan: Binh Lozano is a 24 y.o. male with episodic migraine with aura, more frequent recently. Syncopal episode after a prolonged migraine in 2018, with unremarkable workup per ED. He had 3 concussions in his youth (last being 5 years ago) with playing football. He works as a supervisor in a machine shop.      I recommend:   1. MRI Brain without contrast to evaluate worsening headaches; however, I suspect this, along with his complaints of sluggishness, memory loss, and reduced appetite to be related to the Zonegran increase, which he may not be tolerating-these are known side effects.   2. CBC, CMP, and TSH  3. Note elevated BP today.    4. Reduce Zonegran back to 50 mg qhs. While this was not fully controlling his headaches, he wasn't experiencing side effects at this time.   5. Update clinic in 2 weeks on status with reduced Zonegran dose and consider starting trial of Propranolol at that time. Prefer not to start one medication while weaning another to determine response/side effects.   6.Keep headache log until next visit.   7. Continue Toradol 10 mg prn to abort headaches, but he has tried this, as headaches are resolved with Zonegran increase. GI/CV risks reviewed. Limit use to 2 days per week.   8. He did suffer a brief vasal vagal syncopal spell when standing after prolonged headache this month. Monitor for any recurrent symptoms. Notify me if so. CT head, EKG and troponin in the ER negative.     RTC 6 weeks

## 2020-01-23 NOTE — PATIENT INSTRUCTIONS
Update clinic in 2 weeks via phone or portal message regarding:     Sluggishness  Reduced appetite  Memory loss  Headache frequency

## 2020-01-24 ENCOUNTER — PATIENT MESSAGE (OUTPATIENT)
Dept: NEUROLOGY | Facility: CLINIC | Age: 25
End: 2020-01-24

## 2020-01-24 NOTE — TELEPHONE ENCOUNTER
I would be happy to update his paperwork again if this could be faxed in for early next week. In the meantime, I have written a letter for him to present to work.

## 2021-05-06 ENCOUNTER — PATIENT MESSAGE (OUTPATIENT)
Dept: RESEARCH | Facility: HOSPITAL | Age: 26
End: 2021-05-06

## 2024-08-25 ENCOUNTER — HOSPITAL ENCOUNTER (EMERGENCY)
Facility: HOSPITAL | Age: 29
Discharge: HOME | End: 2024-08-25
Attending: SURGERY

## 2024-08-25 VITALS
HEART RATE: 73 BPM | WEIGHT: 217.63 LBS | HEIGHT: 72 IN | TEMPERATURE: 99 F | BODY MASS INDEX: 29.48 KG/M2 | RESPIRATION RATE: 18 BRPM | SYSTOLIC BLOOD PRESSURE: 132 MMHG | OXYGEN SATURATION: 97 % | DIASTOLIC BLOOD PRESSURE: 87 MMHG

## 2024-08-25 DIAGNOSIS — M53.3 SACRAL PAIN: Primary | ICD-10-CM

## 2024-08-25 PROCEDURE — 63600175 PHARM REV CODE 636 W HCPCS: Performed by: SURGERY

## 2024-08-25 PROCEDURE — 96372 THER/PROPH/DIAG INJ SC/IM: CPT | Performed by: SURGERY

## 2024-08-25 PROCEDURE — 99285 EMERGENCY DEPT VISIT HI MDM: CPT | Mod: 25

## 2024-08-25 RX ORDER — CYCLOBENZAPRINE HCL 10 MG
10 TABLET ORAL 3 TIMES DAILY PRN
Qty: 10 TABLET | Refills: 0 | Status: SHIPPED | OUTPATIENT
Start: 2024-08-25 | End: 2024-08-30

## 2024-08-25 RX ORDER — KETOROLAC TROMETHAMINE 30 MG/ML
60 INJECTION, SOLUTION INTRAMUSCULAR; INTRAVENOUS
Status: COMPLETED | OUTPATIENT
Start: 2024-08-25 | End: 2024-08-25

## 2024-08-25 RX ORDER — IBUPROFEN 800 MG/1
800 TABLET ORAL EVERY 6 HOURS PRN
Qty: 20 TABLET | Refills: 0 | Status: SHIPPED | OUTPATIENT
Start: 2024-08-25

## 2024-08-25 RX ADMIN — KETOROLAC TROMETHAMINE 60 MG: 30 INJECTION, SOLUTION INTRAMUSCULAR at 07:08

## 2024-08-26 NOTE — ED PROVIDER NOTES
Encounter Date: 8/25/2024       History     Chief Complaint   Patient presents with    Tailbone Pain     History of Present Illness  Binh Lozano is a 29 y.o. male that presents with low back pain  Patient has a low back pain area L5/S2 area near the right gluteal cleft area  Patient denies any trauma fall, patient was no obvious signs of infection now  Patient was no signs of perirectal abscess, patient was no signs of cellulitis  Patient has tenderness in the right gluteal cleft at the S2 level on ED exam  Normal neuro exam, no previous back pain/injury, no previous back surgery    The history is provided by the patient.     Review of patient's allergies indicates:  No Known Allergies  Past Medical History:   Diagnosis Date    Concussion     x2     Knee MCL sprain     left knee    Scaphoid fracture of wrist      Past Surgical History:   Procedure Laterality Date    ORIF RADIUS & ULNA FRACTURES Left 12/02/2016     Family History   Problem Relation Name Age of Onset    No Known Problems Mother      No Known Problems Father       Social History     Tobacco Use    Smoking status: Never   Substance Use Topics    Alcohol use: No    Drug use: No     Review of Systems   Constitutional:  Negative for diaphoresis, fatigue and fever.   HENT:  Negative for ear pain, hearing loss and tinnitus.    Eyes:  Negative for pain and redness.   Respiratory:  Negative for cough, shortness of breath and wheezing.    Cardiovascular:  Negative for chest pain.   Gastrointestinal:  Negative for abdominal pain, constipation, diarrhea, nausea and rectal pain.   Musculoskeletal:  Negative for back pain, neck pain and neck stiffness.   Neurological:  Negative for dizziness, seizures, numbness and headaches.   Psychiatric/Behavioral:  Negative for confusion, decreased concentration and dysphoric mood.    All other systems reviewed and are negative.      Physical Exam     Initial Vitals [08/25/24 1828]   BP Pulse Resp Temp SpO2    132/87 73 18 98.5 °F (36.9 °C) 97 %      MAP       --         Physical Exam    Nursing note and vitals reviewed.  Constitutional: Vital signs are normal. He appears well-developed and well-nourished. He is cooperative.   HENT:   Head: Normocephalic and atraumatic.   Mouth/Throat: Uvula is midline and mucous membranes are normal.   Eyes: Conjunctivae, EOM and lids are normal. Pupils are equal, round, and reactive to light.   Neck: Neck supple.   Normal range of motion.   Full passive range of motion without pain.     Cardiovascular:  Normal rate, regular rhythm, S1 normal, S2 normal, normal heart sounds, intact distal pulses and normal pulses.           Pulmonary/Chest: Effort normal and breath sounds normal.   Abdominal: Abdomen is soft and flat. Bowel sounds are normal.   Musculoskeletal:      Cervical back: Full passive range of motion without pain, normal range of motion and neck supple.      Comments: (+) tenderness in the right gluteal cleft at the level of S2     Neurological: He is alert and oriented to person, place, and time. He has normal strength.   Skin: Skin is warm, dry and intact. Capillary refill takes less than 2 seconds.         ED Course   Procedures  Labs Reviewed - No data to display       Imaging Results              CT Lumbar Spine Without Contrast (Final result)  Result time 08/25/24 19:03:38      Final result by Toñito Bell DO (08/25/24 19:03:38)                   Impression:      No acute fracture or subluxation of the lumbar spine.      Electronically signed by: Toñito Bell  Date:    08/25/2024  Time:    19:03               Narrative:    EXAMINATION:  CT LUMBAR SPINE WITHOUT CONTRAST    CLINICAL HISTORY:  Low back pain, no red flags, no prior management;    TECHNIQUE:  Axial, sagittal, and coronal reformations are obtained through the lumbar spine without intravenous contrast.    COMPARISON:  Lumbar spine radiographs from 05/04/2017.    FINDINGS:  Alignment:  Normal.    Vertebrae: There is no acute fracture or subluxation of the lumbar spine. Vertebral body heights are maintained. There is no aggressive osseous lesion.    Discs: Disc heights are maintained.    Degenerative changes: No significant degenerative changes. There is no high-grade osseous spinal canal stenosis or neural foraminal narrowing.    Miscellaneous: The paravertebral soft tissues are unremarkable.                                       Medications   ketorolac injection 60 mg (has no administration in time range)     Medical Decision Making  Differential includes lumbar sprain, strain, gluteal abscess, gluteal cellulitis    Problems Addressed:  Sacral pain: complicated acute illness or injury    Amount and/or Complexity of Data Reviewed  Radiology: ordered and independent interpretation performed.    ED Management & Risks of Complication, Morbidity, & Mortality:  Tenderness in the right gluteal cleft area at the level of S2, no abscess  No infection, no cellulitis, no indurated hair follicle on ER evaluation  Patient denies any trauma, normal lumbar & neurologic exam today  CT scan shows no acute findings, patient counseled on ER discharge  Toradol for pain with a prescription of Motrin & Flexeril on ER discharge  Patient needs re-examination in the next 48 hours for further evaluation  Patient needs this monitor any developments after this ED exam  Patient also counseled at length return with any concerning issues  Patient also told to return with any evidence of infection or abscess  Pt/Family counseled to return to the ER with any concerns on DC    Need for Hospitalization or Surgery with Social Determinants of Health:  This patient does not need to be hospitalized on ER evaluation today  The patient's diagnosis is not limited by social determinants of health  Does not require surgery or procedure (major or minor), no risk factors    Clinical Impression:  Final diagnoses:  [M53.3] Sacral pain  (Primary)          ED Disposition Condition    Discharge Stable          ED Prescriptions       Medication Sig Dispense Start Date End Date Auth. Provider    ibuprofen (ADVIL,MOTRIN) 800 MG tablet Take 1 tablet (800 mg total) by mouth every 6 (six) hours as needed for Pain. 20 tablet 8/25/2024 -- James Pollock MD    cyclobenzaprine (FLEXERIL) 10 MG tablet Take 1 tablet (10 mg total) by mouth 3 (three) times daily as needed for Muscle spasms. 10 tablet 8/25/2024 8/30/2024 James Pollock MD          Follow-up Information       Follow up With Specialties Details Why Contact Info    Ed Benoit MD Family Medicine Go in 2 days  [address] [id number]  [phone]               James Pollock MD  08/25/24 [id number]